# Patient Record
Sex: MALE | Race: WHITE | Employment: FULL TIME | URBAN - NONMETROPOLITAN AREA
[De-identification: names, ages, dates, MRNs, and addresses within clinical notes are randomized per-mention and may not be internally consistent; named-entity substitution may affect disease eponyms.]

---

## 2017-01-06 ENCOUNTER — OFFICE VISIT (OUTPATIENT)
Dept: FAMILY MEDICINE | Facility: OTHER | Age: 52
End: 2017-01-06
Attending: FAMILY MEDICINE
Payer: COMMERCIAL

## 2017-01-06 VITALS
OXYGEN SATURATION: 94 % | RESPIRATION RATE: 16 BRPM | BODY MASS INDEX: 30.8 KG/M2 | SYSTOLIC BLOOD PRESSURE: 130 MMHG | HEIGHT: 74 IN | WEIGHT: 240 LBS | HEART RATE: 61 BPM | DIASTOLIC BLOOD PRESSURE: 88 MMHG | TEMPERATURE: 97.2 F

## 2017-01-06 DIAGNOSIS — R05.9 COUGH: Primary | ICD-10-CM

## 2017-01-06 DIAGNOSIS — J01.01 ACUTE RECURRENT MAXILLARY SINUSITIS: ICD-10-CM

## 2017-01-06 DIAGNOSIS — L29.0 ANAL PRURITUS: ICD-10-CM

## 2017-01-06 PROCEDURE — 99214 OFFICE O/P EST MOD 30 MIN: CPT | Performed by: FAMILY MEDICINE

## 2017-01-06 PROCEDURE — 71020 ZZHC CHEST TWO VIEWS, FRONT/LAT: CPT | Mod: TC | Performed by: RADIOLOGY

## 2017-01-06 RX ORDER — LEVOFLOXACIN 500 MG/1
500 TABLET, FILM COATED ORAL DAILY
Qty: 10 TABLET | Refills: 0 | Status: SHIPPED | OUTPATIENT
Start: 2017-01-06 | End: 2017-05-05

## 2017-01-06 RX ORDER — CRANBERRY FRUIT EXTRACT 200 MG
1 CAPSULE ORAL 2 TIMES DAILY
Qty: 100 CAPSULE | Refills: 3 | Status: SHIPPED | OUTPATIENT
Start: 2017-01-06

## 2017-01-06 RX ORDER — ALBUTEROL SULFATE 90 UG/1
2 AEROSOL, METERED RESPIRATORY (INHALATION) EVERY 6 HOURS PRN
Qty: 1 INHALER | Refills: 0 | Status: SHIPPED | OUTPATIENT
Start: 2017-01-06 | End: 2017-05-05

## 2017-01-06 ASSESSMENT — PAIN SCALES - GENERAL: PAINLEVEL: NO PAIN (0)

## 2017-01-06 NOTE — NURSING NOTE
"Chief Complaint   Patient presents with     URI       Initial /88 mmHg  Pulse 61  Temp(Src) 97.2  F (36.2  C)  Resp 16  Ht 6' 2\" (1.88 m)  Wt 240 lb (108.863 kg)  BMI 30.80 kg/m2  SpO2 94% Estimated body mass index is 30.8 kg/(m^2) as calculated from the following:    Height as of this encounter: 6' 2\" (1.88 m).    Weight as of this encounter: 240 lb (108.863 kg).  BP completed using cuff size: baljit Patterson      "

## 2017-01-06 NOTE — MR AVS SNAPSHOT
After Visit Summary   1/6/2017    Stu Lanza    MRN: [id number]           Patient Information     Date Of Birth          1965        Visit Information        Provider Department      1/6/2017 9:20 AM Kwame Vasquez MD [place] [facility]        Today's Diagnoses     Cough    -  1     Acute recurrent maxillary sinusitis         Anal pruritus           Care Instructions    Use albuterol metered dose inhaler as needed for cough.  Take levofloxacin once daily.        Follow-ups after your visit        Follow-up notes from your care team     Return if symptoms worsen or fail to improve.      Your next 10 appointments already scheduled     Mervin 10, 2017  2:45 PM   (Arrive by 2:30 PM)   Office Visit with PEDRO LUIS Lantigua MD   [facility] ([facility])    [address]  [facility] [address]   [phone]           Bring a current list of meds and any records pertaining to this visit.  For Physicals, please bring immunization records and any forms needing to be filled out.    Please arrive 15 minutes early to complete paperwork and register.              Who to contact     If you have questions or need follow up information about today's clinic visit or your schedule please contact [facility] directly at [phone].  Normal or non-critical lab and imaging results will be communicated to you by MyChart, letter or phone within 4 business days after the clinic has received the results. If you do not hear from us within 7 days, please contact the clinic through MyChart or phone. If you have a critical or abnormal lab result, we will notify you by phone as soon as possible.  Submit refill requests through [facility] or call your pharmacy and they will forward the refill request to us. Please allow 3 business days for your refill to be completed.          Additional Information About Your Visit        MyChart Information     [facility] lets you send messages to your  "doctor, view your test results, renew your prescriptions, schedule appointments and more. To sign up, go to [email or web address] . Click on \"Log in\" on the left side of the screen, which will take you to the Welcome page. Then click on \"Sign up Now\" on the right side of the page.     You will be asked to enter the access code listed below, as well as some personal information. Please follow the directions to create your username and password.     Your access code is: [email or web address]  Expires: 2017  8:12 AM     Your access code will  in 90 days. If you need help or a new code, please call your [place] clinic or [phone].        Care EveryWhere ID     This is your Care EveryWhere ID. This could be used by other organizations to access your [place] medical records  [id number]        Your Vitals Were     Pulse Temperature Respirations Height BMI (Body Mass Index) Pulse Oximetry    61 97.2  F (36.2  C) 16 6' 2\" (1.88 m) 30.80 kg/m2 94%       Blood Pressure from Last 3 Encounters:   17 130/88   11/10/16 132/84   16 130/90    Weight from Last 3 Encounters:   17 240 lb (108.863 kg)   11/10/16 230 lb (104.327 kg)   16 244 lb 3.2 oz (110.768 kg)              We Performed the Following     XR CHEST 2 VW (Clinic Performed)          Today's Medication Changes          These changes are accurate as of: 17 11:59 PM.  If you have any questions, ask your nurse or doctor.               Start taking these medicines.        Dose/Directions    ACIDOPHILUS PROBIOTIC BLEND Caps   Used for:  Anal pruritus   Started by:  Kwame Vasquez MD        Dose:  1 capsule   Take 1 capsule by mouth 2 times daily   Quantity:  100 capsule   Refills:  3       albuterol 108 (90 BASE) MCG/ACT Inhaler   Commonly known as:  PROAIR HFA/PROVENTIL HFA/VENTOLIN HFA   Used for:  Cough   Started by:  Kwame Vasquez MD        Dose:  2 puff   Inhale 2 puffs into the lungs every 6 hours as needed for shortness of " breath / dyspnea or wheezing   Quantity:  1 Inhaler   Refills:  0         Stop taking these medicines if you haven't already. Please contact your care team if you have questions.     cetirizine-psuedoePHEDrine 5-120 MG per 12 hr tablet   Commonly known as:  zyrTEC-D   Stopped by:  Kwame Vasquez MD           NO ACTIVE MEDICATIONS   Stopped by:  Kwame Vasquez MD                Where to get your medicines      These medications were sent to [id number] - [place], [place] - [address] AT [facility] of [address], [facility] [place] [address]     Phone:  [phone]    - ACIDOPHILUS PROBIOTIC BLEND Caps  - albuterol 108 (90 BASE) MCG/ACT Inhaler  - levofloxacin 500 MG tablet             Primary Care Provider Office Phone # Fax #    [place] [phone]       [address] [address]        Thank you!     Thank you for choosing [facility]  for your care. Our goal is always to provide you with excellent care. Hearing back from our patients is one way we can continue to improve our services. Please take a few minutes to complete the written survey that you may receive in the mail after your visit with us. Thank you!             Your Updated Medication List - Protect others around you: Learn how to safely use, store and throw away your medicines at www.disposemymeds.org.          This list is accurate as of: 1/6/17 11:59 PM.  Always use your most recent med list.                   Brand Name Dispense Instructions for use    ACIDOPHILUS PROBIOTIC BLEND Caps     100 capsule    Take 1 capsule by mouth 2 times daily       albuterol 108 (90 BASE) MCG/ACT Inhaler    PROAIR HFA/PROVENTIL HFA/VENTOLIN HFA    1 Inhaler    Inhale 2 puffs into the lungs every 6 hours as needed for shortness of breath / dyspnea or wheezing       levofloxacin 500 MG tablet    LEVAQUIN    10 tablet    Take 1 tablet (500 mg) by mouth daily

## 2017-01-08 NOTE — PROGRESS NOTES
SUBJECTIVE:  Stu Lanza, 51 year old, male is seen with persistent nasal congestion, cough, and ear plugging.  These symptoms have been present over the last several weeks.  He was previously seen and placed on levofloxacin as well as Zyrtec D.  Stu noted mild improvement but then his symptoms recurred.  He now has developed cough with wheezing and chest tightness.  This is worse when exposed to cold air. He has no previous history of asthma or allergic rhinitis.    In addition, he has noted itching which involves the rectal area a well as the perinueum.  Stu has tried over the counter medications with minimal improvement.  He has questions regarding fungal infection and his recent use of antibiotics.  Stu has had previous colonoscopy.    Denies fever, shaking, chills, nausea, vomiting, or diarrhea.  No household contacts are ill.      Current Outpatient Prescriptions   Medication Sig Dispense Refill     levofloxacin (LEVAQUIN) 500 MG tablet Take 1 tablet (500 mg) by mouth daily 10 tablet 0     albuterol (PROAIR HFA/PROVENTIL HFA/VENTOLIN HFA) 108 (90 BASE) MCG/ACT Inhaler Inhale 2 puffs into the lungs every 6 hours as needed for shortness of breath / dyspnea or wheezing 1 Inhaler 0     Probiotic Product (ACIDOPHILUS PROBIOTIC BLEND) CAPS Take 1 capsule by mouth 2 times daily 100 capsule 3      No Known Allergies    History reviewed. No pertinent past medical history.  Past Surgical History   Procedure Laterality Date     Examegd  12/2015     Colonoscopy N/A 4/27/2016     Procedure: COLONOSCOPY;  Surgeon: Thomas Elizalde DO;  Location: [facility] OR     Family History   Problem Relation Age of Onset     DIABETES Father      Social History     Social History     Marital Status:      Spouse Name: N/A     Number of Children: N/A     Years of Education: N/A     Occupational History     Not on file.     Social History Main Topics     Smoking status: Never Smoker      Smokeless tobacco: Never Used      Alcohol Use: Yes     Drug Use: No     Sexual Activity: Not on file     Other Topics Concern     Parent/Sibling W/ Cabg, Mi Or Angioplasty Before 65f 55m? No     Social History Narrative         Review Of Systems  Constitutional, HEENT, cardiovascular, pulmonary, gi and gu systems are negative, except as otherwise noted.    OBJECTIVE:  Vitals: B/P: 130/88, T: 97.2, P: 61, R: 16    Exam:  Physical Exam   Constitutional: He is oriented to person, place, and time. He appears well-developed and well-nourished. No distress.   HENT:   Head: Normocephalic.   Right Ear: Hearing, tympanic membrane, external ear and ear canal normal.   Left Ear: Hearing, tympanic membrane, external ear and ear canal normal.   Nose: Right sinus exhibits maxillary sinus tenderness. Right sinus exhibits no frontal sinus tenderness. Left sinus exhibits no maxillary sinus tenderness and no frontal sinus tenderness.   Mouth/Throat: Uvula is midline and oropharynx is clear and moist. No oropharyngeal exudate or posterior oropharyngeal erythema.   Eyes: Conjunctivae are normal.   Neck: Neck supple.   Pulmonary/Chest: Effort normal. He has wheezes.   Lymphadenopathy:     He has no cervical adenopathy.   Neurological: He is alert and oriented to person, place, and time.   Skin: Skin is warm and dry.   Psychiatric: He has a normal mood and affect.     Other exam not repeated    Labs:  Admission on 04/27/2016, Discharged on 04/27/2016   Component Date Value Ref Range Status     Copath Report 04/27/2016    Final                    Value:Patient Name: JERICHO FITZGERALD  MR#: [id number]  Specimen #:   Collected: 4/27/2016  Received: 4/27/2016  Reported: 4/28/2016 16:00  Ordering Phy(s): GRISELDA HANKINS  Additional Phy(s): PEDRO LUIS CAUSEY    SPECIMEN(S):  A: Colon polyp, transverse  B: Rectal polyp  C: Skin, anus punch biopsy    FINAL DIAGNOSIS:  A: Colon, transverse, polypectomy  - Mild nonspecific inflammation    B: Colon, rectum, polypectomy  -  Tubular adenoma    C: Skin, anus, punch biopsy  - 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    CLINICAL HISTORY:  Diarrhea; colonoscopy with polypectomy.    GROSS:  A: The specimen is 0.7 x 0.5 x 0.3 cm. (1 TE in 1 block).    B: There are two pieces of tan soft tissue which are 0.2 and 0.3 cm. (2  TE in 1 block).    C: The specimen is a 0.5 x 0.5 cm disc of tan soft tissue.  After the  margin is inked it is bisected. (2 TE in 1 block). (Dictated by: Andi Rubin MD 4/27/2016 04:24 PM)    MI                          CROSCOPIC:  A: Microscopic sections show colon mucosa.  There is a mild nonspecific  increase in eosinophils in the lamina propria but not within the  epithelium.  This is probably related to the procedure.  The mucosa is  otherwise unremarkable.    B: Microscopic sections show colon mucosa.  There is a tubular adenoma.  The glands vary in size and shape with decreased mucin production.    C: Microscopic sections show skin with acanthosis and hyperkeratosis.  This appears to be a reactive change.  The etiology of this change is  not apparent on this biopsy.    CPT Codes  A: 35723-VP7  B: 87600-BN1  C: 70553-CW6    TESTING LAB LOCATION:  [address] [address]  [phone]    COLLECTION SITE:  Client: [facility]  Location: [facility] ()         ASSESSMENT/PLAN:  Cough  Chest xray performed and show no evidence of infiltrate.  Suspect post infectious bronchospasm.  Albuterol metered dose inhaler as needed.  - XR CHEST 2 VW (Clinic Performed)  - albuterol (PROAIR HFA/PROVENTIL HFA/VENTOLIN HFA) 108 (90 BASE) MCG/ACT Inhaler; Inhale 2 puffs into the lungs every 6 hours as needed for shortness of breath / dyspnea or wheezing    Acute recurrent maxillary sinusitis  Levaquin as written.  - levofloxacin (LEVAQUIN) 500 MG tablet; Take 1 tablet (500 mg) by mouth  daily    Anal pruritus  Begin probiotic.  If persists, recommend topical corticosteroid with antifungal.  - Probiotic Product (ACIDOPHILUS PROBIOTIC BLEND) CAPS; Take 1 capsule by mouth 2 times daily            Kwame Vasquez MD

## 2017-05-05 ENCOUNTER — OFFICE VISIT (OUTPATIENT)
Dept: FAMILY MEDICINE | Facility: OTHER | Age: 52
End: 2017-05-05
Attending: FAMILY MEDICINE
Payer: COMMERCIAL

## 2017-05-05 VITALS
BODY MASS INDEX: 29.53 KG/M2 | TEMPERATURE: 97.6 F | DIASTOLIC BLOOD PRESSURE: 92 MMHG | WEIGHT: 230 LBS | RESPIRATION RATE: 16 BRPM | HEART RATE: 66 BPM | SYSTOLIC BLOOD PRESSURE: 138 MMHG | OXYGEN SATURATION: 96 %

## 2017-05-05 DIAGNOSIS — J30.1 SEASONAL ALLERGIC RHINITIS DUE TO POLLEN: ICD-10-CM

## 2017-05-05 DIAGNOSIS — R19.7 DIARRHEA OF PRESUMED INFECTIOUS ORIGIN: Primary | ICD-10-CM

## 2017-05-05 DIAGNOSIS — L30.9 DERMATITIS: ICD-10-CM

## 2017-05-05 LAB
BASOPHILS # BLD AUTO: 0 10E9/L (ref 0–0.2)
BASOPHILS NFR BLD AUTO: 0.5 %
DIFFERENTIAL METHOD BLD: ABNORMAL
EOSINOPHIL # BLD AUTO: 2.1 10E9/L (ref 0–0.7)
EOSINOPHIL NFR BLD AUTO: 31.4 %
ERYTHROCYTE [DISTWIDTH] IN BLOOD BY AUTOMATED COUNT: 13.1 % (ref 10–15)
HCT VFR BLD AUTO: 44.3 % (ref 40–53)
HGB BLD-MCNC: 15.5 G/DL (ref 13.3–17.7)
IMM GRANULOCYTES # BLD: 0 10E9/L (ref 0–0.4)
IMM GRANULOCYTES NFR BLD: 0.5 %
LYMPHOCYTES # BLD AUTO: 1 10E9/L (ref 0.8–5.3)
LYMPHOCYTES NFR BLD AUTO: 15.2 %
MCH RBC QN AUTO: 30.2 PG (ref 26.5–33)
MCHC RBC AUTO-ENTMCNC: 35 G/DL (ref 31.5–36.5)
MCV RBC AUTO: 86 FL (ref 78–100)
MONOCYTES # BLD AUTO: 0.5 10E9/L (ref 0–1.3)
MONOCYTES NFR BLD AUTO: 7.5 %
NEUTROPHILS # BLD AUTO: 3 10E9/L (ref 1.6–8.3)
NEUTROPHILS NFR BLD AUTO: 44.9 %
NRBC # BLD AUTO: 0 10*3/UL
NRBC BLD AUTO-RTO: 0 /100
PLATELET # BLD AUTO: 188 10E9/L (ref 150–450)
RBC # BLD AUTO: 5.13 10E12/L (ref 4.4–5.9)
WBC # BLD AUTO: 6.7 10E9/L (ref 4–11)

## 2017-05-05 PROCEDURE — 99214 OFFICE O/P EST MOD 30 MIN: CPT | Performed by: FAMILY MEDICINE

## 2017-05-05 PROCEDURE — 36415 COLL VENOUS BLD VENIPUNCTURE: CPT | Performed by: FAMILY MEDICINE

## 2017-05-05 PROCEDURE — 85025 COMPLETE CBC W/AUTO DIFF WBC: CPT | Performed by: FAMILY MEDICINE

## 2017-05-05 RX ORDER — NYSTATIN AND TRIAMCINOLONE ACETONIDE 100000; 1 [USP'U]/G; MG/G
CREAM TOPICAL 2 TIMES DAILY
Qty: 30 G | Refills: 1 | Status: SHIPPED | OUTPATIENT
Start: 2017-05-05 | End: 2018-10-09

## 2017-05-05 RX ORDER — SACCHAROMYCES BOULARDII 250 MG
250 CAPSULE ORAL 2 TIMES DAILY
COMMUNITY
End: 2018-10-09

## 2017-05-05 ASSESSMENT — PAIN SCALES - GENERAL: PAINLEVEL: NO PAIN (1)

## 2017-05-05 NOTE — NURSING NOTE
"Chief Complaint   Patient presents with     Abdominal Pain     x 1 week     Diarrhea     x 1 week       Initial BP (!) 138/92 (BP Location: Right arm, Patient Position: Chair, Cuff Size: Adult Regular)  Pulse 66  Temp 97.6  F (36.4  C) (Tympanic)  Resp 16  Wt 230 lb (104.3 kg)  SpO2 96%  BMI 29.53 kg/m2 Estimated body mass index is 29.53 kg/(m^2) as calculated from the following:    Height as of 1/6/17: 6' 2\" (1.88 m).    Weight as of this encounter: 230 lb (104.3 kg).  Medication Reconciliation: complete   Maria E Munroe    "

## 2017-05-05 NOTE — MR AVS SNAPSHOT
"              After Visit Summary   5/5/2017    Stu Lanza    MRN: [id number]           Patient Information     Date Of Birth          1965        Visit Information        Provider Department      5/5/2017 8:40 AM Kwame Vasquez MD [facility]        Today's Diagnoses     Diarrhea of presumed infectious origin    -  1    Seasonal allergic rhinitis due to pollen        Dermatitis          Care Instructions    Cultures pending.        Follow-ups after your visit        Follow-up notes from your care team     Return if symptoms worsen or fail to improve.      Who to contact     If you have questions or need follow up information about today's clinic visit or your schedule please contact [facility] directly at [phone].  Normal or non-critical lab and imaging results will be communicated to you by MyChart, letter or phone within 4 business days after the clinic has received the results. If you do not hear from us within 7 days, please contact the clinic through MyChart or phone. If you have a critical or abnormal lab result, we will notify you by phone as soon as possible.  Submit refill requests through [facility] or call your pharmacy and they will forward the refill request to us. Please allow 3 business days for your refill to be completed.          Additional Information About Your Visit        MyChart Information     [facility] lets you send messages to your doctor, view your test results, renew your prescriptions, schedule appointments and more. To sign up, go to [email or web address] . Click on \"Log in\" on the left side of the screen, which will take you to the Welcome page. Then click on \"Sign up Now\" on the right side of the page.     You will be asked to enter the access code listed below, as well as some personal information. Please follow the directions to create your username and password.     Your access code is: [email or web address]  Expires: 8/7/2017  5:02 AM     Your " access code will  in 90 days. If you need help or a new code, please call your [facility] or [phone].        Care EveryWhere ID     This is your Care EveryWhere ID. This could be used by other organizations to access your [place] medical records  [id number]        Your Vitals Were     Pulse Temperature Respirations Pulse Oximetry BMI (Body Mass Index)       66 97.6  F (36.4  C) (Tympanic) 16 96% 29.53 kg/m2        Blood Pressure from Last 3 Encounters:   17 (!) 138/92   17 130/88   11/10/16 132/84    Weight from Last 3 Encounters:   17 230 lb (104.3 kg)   17 240 lb (108.9 kg)   11/10/16 230 lb (104.3 kg)              We Performed the Following     CBC with platelets differential          Today's Medication Changes          These changes are accurate as of: 17 11:59 PM.  If you have any questions, ask your nurse or doctor.               Start taking these medicines.        Dose/Directions    nystatin-triamcinolone cream   Commonly known as:  MYCOLOG II   Used for:  Dermatitis        Apply topically 2 times daily   Quantity:  30 g   Refills:  1            Where to get your medicines      These medications were sent to [facility] [address] [place] [address] [address] AT [facility] of [address], [facility] [address]     Phone:  [phone]     nystatin-triamcinolone cream                Primary Care Provider Office Phone # Fax #    [place] [phone]       [facility] [place] [address] [place] [address]        Thank you!     Thank you for choosing [facility]  for your care. Our goal is always to provide you with excellent care. Hearing back from our patients is one way we can continue to improve our services. Please take a few minutes to complete the written survey that you may receive in the mail after your visit with us. Thank you!             Your Updated Medication List - Protect others  around you: Learn how to safely use, store and throw away your medicines at www.disposemymeds.org.          This list is accurate as of: 5/5/17 11:59 PM.  Always use your most recent med list.                   Brand Name Dispense Instructions for use    ACIDOPHILUS PROBIOTIC BLEND Caps     100 capsule    Take 1 capsule by mouth 2 times daily       nystatin-triamcinolone cream    MYCOLOG II    30 g    Apply topically 2 times daily       saccharomyces boulardii 250 MG capsule    FLORASTOR     Take 250 mg by mouth 2 times daily

## 2017-05-05 NOTE — PROGRESS NOTES
SUBJECTIVE:  Stu Lanza, 51 year old, male is seen with diarrhea.  Present over the last week.   He weill note associated cramping.  [place]u [place] a [place] of time [place]. No household contacts are ill.  He has had previous EGD and colonoscopy.    In addition, he notes persistent nasal congestion and post nasal drainage.  Present over the last month.  Stu also notes similar symptoms in the fall.    He has had a rash.  Located in the buttock region.  Present over the last several months.  Stu has tried Nystatin with minimal improvement.    Denies fever, shaking, chills, dysphagia, change in appetite, weight loss, nausea, vomiting, melena, or hematochezia.  No change in bowel habits other that the diarrhea..      Current Outpatient Prescriptions   Medication Sig Dispense Refill     saccharomyces boulardii (FLORASTOR) 250 MG capsule Take 250 mg by mouth 2 times daily       Probiotic Product (ACIDOPHILUS PROBIOTIC BLEND) CAPS Take 1 capsule by mouth 2 times daily 100 capsule 3      No Known Allergies    No past medical history on file.  Past Surgical History:   Procedure Laterality Date     COLONOSCOPY N/A 4/27/2016    Procedure: COLONOSCOPY;  Surgeon: Thomas Elizalde DO;  Location: [facility] OR     ESOPHAGOGASTRODUODENOSCOPY  12/2015     Family History   Problem Relation Age of Onset     DIABETES Father      Social History     Social History     Marital status:      Spouse name: N/A     Number of children: N/A     Years of education: N/A     Occupational History     Not on file.     Social History Main Topics     Smoking status: Never Smoker     Smokeless tobacco: Never Used     Alcohol use Yes     Drug use: No     Sexual activity: Not on file     Other Topics Concern     Parent/Sibling W/ Cabg, Mi Or Angioplasty Before 65f 55m? No     Social History Narrative         Review Of Systems  Constitutional, HEENT, cardiovascular, pulmonary, gi and gu systems are negative, except as otherwise  noted.    OBJECTIVE:  Vitals: B/P: 138/92, T: 97.6, P: 66, R: 16    Exam:  Physical Exam   Constitutional: He is oriented to person, place, and time. He appears well-developed and well-nourished. No distress.   HENT:   Head: Normocephalic and atraumatic.   Right Ear: Hearing, tympanic membrane, external ear and ear canal normal.   Left Ear: Hearing, tympanic membrane, external ear and ear canal normal.   Nose: Nose normal. Right sinus exhibits no maxillary sinus tenderness and no frontal sinus tenderness. Left sinus exhibits no maxillary sinus tenderness and no frontal sinus tenderness.   Mouth/Throat: Oropharynx is clear and moist.   Eyes: Conjunctivae are normal.   Neck: Neck supple. No thyromegaly present.   Cardiovascular: Regular rhythm.    Pulmonary/Chest: Effort normal and breath sounds normal. He has no wheezes. He has no rales.   Abdominal: Soft. Bowel sounds are normal. He exhibits no distension and no mass. There is no hepatosplenomegaly. There is no tenderness. There is no CVA tenderness, no tenderness at McBurney's point and negative Mccartney's sign. No hernia.   Lymphadenopathy:     He has no cervical adenopathy.   Neurological: He is alert and oriented to person, place, and time.   Skin: Skin is warm and dry.   Psychiatric: He has a normal mood and affect.     Other exam not repeated    Labs:  Admission on 04/27/2016, Discharged on 04/27/2016   Component Date Value Ref Range Status     Copath Report 04/27/2016    Final                    Value:Patient Name: JERICHO FITZGERALD  MR#: [id number]  Specimen #:   Collected: 4/27/2016  Received: 4/27/2016  Reported: 4/28/2016 16:00  Ordering Phy(s): GRISELDA HANKINS  Additional Phy(s): PEDRO LUIS CAUSEY    SPECIMEN(S):  A: Colon polyp, transverse  B: Rectal polyp  C: Skin, anus punch biopsy    FINAL DIAGNOSIS:  A: Colon, transverse, polypectomy  - Mild nonspecific inflammation    B: Colon, rectum, polypectomy  - Tubular adenoma    C: Skin, anus, punch  biopsy  - 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    CLINICAL HISTORY:  Diarrhea; colonoscopy with polypectomy.    GROSS:  A: The specimen is 0.7 x 0.5 x 0.3 cm. (1 TE in 1 block).    B: There are two pieces of tan soft tissue which are 0.2 and 0.3 cm. (2  TE in 1 block).    C: The specimen is a 0.5 x 0.5 cm disc of tan soft tissue.  After the  margin is inked it is bisected. (2 TE in 1 block). (Dictated by: Andi Rubin MD 4/27/2016 04:24 PM)    MI                          CROSCOPIC:  A: Microscopic sections show colon mucosa.  There is a mild nonspecific  increase in eosinophils in the lamina propria but not within the  epithelium.  This is probably related to the procedure.  The mucosa is  otherwise unremarkable.    B: Microscopic sections show colon mucosa.  There is a tubular adenoma.  The glands vary in size and shape with decreased mucin production.    C: Microscopic sections show skin with acanthosis and hyperkeratosis.  This appears to be a reactive change.  The etiology of this change is  not apparent on this biopsy.    CPT Codes  A: 85975-VS7  B: 42219-EY8  C: 18397-BQ2    TESTING LAB LOCATION:  [address] [address]  [phone]    COLLECTION SITE:  Client: [facility]  Location: [facility])         ASSESSMENT/PLAN:  Diarrhea of presumed infectious origin  Etiology undetermined.  Reviewed potential water borne causes.  Labs reviewed.  Cultures pending.  - CBC with platelets differential  - Stool culture SSCE; Future  - Clostridium difficile Toxin B PCR; Future  - Ova and Parasite Screen; Future    Seasonal allergic rhinitis due to pollen  Discussed over the counter non sedating antihistamines.    Dermatitis  Suspect fungal in origin.  Mycolog cream as written.  - nystatin-triamcinolone (MYCOLOG II) cream; Apply topically 2 times daily            Kwame DIAZ  MD Pedro

## 2017-05-08 DIAGNOSIS — R19.7 DIARRHEA OF PRESUMED INFECTIOUS ORIGIN: ICD-10-CM

## 2017-05-08 LAB
C DIFF TOX B STL QL: NORMAL
G LAMBLIA+CRYPTOSP AG STL QL IA: NORMAL
MICRO REPORT STATUS: NORMAL
SPECIMEN SOURCE: NORMAL
SPECIMEN SOURCE: NORMAL

## 2017-05-08 PROCEDURE — 87328 CRYPTOSPORIDIUM AG IA: CPT | Performed by: FAMILY MEDICINE

## 2017-05-08 PROCEDURE — 87015 SPECIMEN INFECT AGNT CONCNTJ: CPT | Performed by: FAMILY MEDICINE

## 2017-05-08 PROCEDURE — 87899 AGENT NOS ASSAY W/OPTIC: CPT | Performed by: FAMILY MEDICINE

## 2017-05-08 PROCEDURE — 87329 GIARDIA AG IA: CPT | Performed by: FAMILY MEDICINE

## 2017-05-08 PROCEDURE — 87045 FECES CULTURE AEROBIC BACT: CPT | Performed by: FAMILY MEDICINE

## 2017-05-08 PROCEDURE — 87493 C DIFF AMPLIFIED PROBE: CPT | Performed by: FAMILY MEDICINE

## 2017-05-08 PROCEDURE — 87046 STOOL CULTR AEROBIC BACT EA: CPT | Mod: 59 | Performed by: FAMILY MEDICINE

## 2017-05-08 PROCEDURE — 87046 STOOL CULTR AEROBIC BACT EA: CPT | Performed by: FAMILY MEDICINE

## 2017-05-08 ASSESSMENT — ANXIETY QUESTIONNAIRES
6. BECOMING EASILY ANNOYED OR IRRITABLE: SEVERAL DAYS
IF YOU CHECKED OFF ANY PROBLEMS ON THIS QUESTIONNAIRE, HOW DIFFICULT HAVE THESE PROBLEMS MADE IT FOR YOU TO DO YOUR WORK, TAKE CARE OF THINGS AT HOME, OR GET ALONG WITH OTHER PEOPLE: NOT DIFFICULT AT ALL
2. NOT BEING ABLE TO STOP OR CONTROL WORRYING: NOT AT ALL
3. WORRYING TOO MUCH ABOUT DIFFERENT THINGS: SEVERAL DAYS
5. BEING SO RESTLESS THAT IT IS HARD TO SIT STILL: NOT AT ALL
7. FEELING AFRAID AS IF SOMETHING AWFUL MIGHT HAPPEN: NOT AT ALL
GAD7 TOTAL SCORE: 3
1. FEELING NERVOUS, ANXIOUS, OR ON EDGE: SEVERAL DAYS

## 2017-05-08 ASSESSMENT — PATIENT HEALTH QUESTIONNAIRE - PHQ9: 5. POOR APPETITE OR OVEREATING: NOT AT ALL

## 2017-05-09 PROBLEM — J30.1 SEASONAL ALLERGIC RHINITIS DUE TO POLLEN: Status: ACTIVE | Noted: 2017-05-09

## 2017-05-09 ASSESSMENT — ANXIETY QUESTIONNAIRES: GAD7 TOTAL SCORE: 3

## 2017-05-09 ASSESSMENT — PATIENT HEALTH QUESTIONNAIRE - PHQ9: SUM OF ALL RESPONSES TO PHQ QUESTIONS 1-9: 5

## 2017-05-11 LAB
BACTERIA SPEC CULT: NORMAL
E COLI SXT1+2 STL IA: NORMAL
MICRO REPORT STATUS: NORMAL
SPECIMEN SOURCE: NORMAL

## 2017-08-10 ENCOUNTER — OFFICE VISIT (OUTPATIENT)
Dept: FAMILY MEDICINE | Facility: OTHER | Age: 52
End: 2017-08-10
Attending: FAMILY MEDICINE
Payer: COMMERCIAL

## 2017-08-10 VITALS
SYSTOLIC BLOOD PRESSURE: 136 MMHG | RESPIRATION RATE: 18 BRPM | BODY MASS INDEX: 29.52 KG/M2 | TEMPERATURE: 97.4 F | WEIGHT: 230 LBS | OXYGEN SATURATION: 96 % | HEART RATE: 60 BPM | HEIGHT: 74 IN | DIASTOLIC BLOOD PRESSURE: 74 MMHG

## 2017-08-10 DIAGNOSIS — A69.20 ERYTHEMA MIGRANS (LYME DISEASE): Primary | ICD-10-CM

## 2017-08-10 PROCEDURE — 99213 OFFICE O/P EST LOW 20 MIN: CPT | Performed by: FAMILY MEDICINE

## 2017-08-10 RX ORDER — DOXYCYCLINE HYCLATE 100 MG
100 TABLET ORAL 2 TIMES DAILY
Qty: 28 TABLET | Refills: 0 | Status: SHIPPED | OUTPATIENT
Start: 2017-08-10 | End: 2017-08-24

## 2017-08-10 ASSESSMENT — ANXIETY QUESTIONNAIRES
5. BEING SO RESTLESS THAT IT IS HARD TO SIT STILL: NOT AT ALL
3. WORRYING TOO MUCH ABOUT DIFFERENT THINGS: NOT AT ALL
GAD7 TOTAL SCORE: 0
1. FEELING NERVOUS, ANXIOUS, OR ON EDGE: NOT AT ALL
6. BECOMING EASILY ANNOYED OR IRRITABLE: NOT AT ALL
IF YOU CHECKED OFF ANY PROBLEMS ON THIS QUESTIONNAIRE, HOW DIFFICULT HAVE THESE PROBLEMS MADE IT FOR YOU TO DO YOUR WORK, TAKE CARE OF THINGS AT HOME, OR GET ALONG WITH OTHER PEOPLE: NOT DIFFICULT AT ALL
7. FEELING AFRAID AS IF SOMETHING AWFUL MIGHT HAPPEN: NOT AT ALL
2. NOT BEING ABLE TO STOP OR CONTROL WORRYING: NOT AT ALL

## 2017-08-10 ASSESSMENT — PATIENT HEALTH QUESTIONNAIRE - PHQ9
SUM OF ALL RESPONSES TO PHQ QUESTIONS 1-9: 0
5. POOR APPETITE OR OVEREATING: NOT AT ALL

## 2017-08-10 ASSESSMENT — PAIN SCALES - GENERAL: PAINLEVEL: NO PAIN (0)

## 2017-08-10 NOTE — PROGRESS NOTES
SUBJECTIVE:  Stu is a 52 year old male who comes in today for rash.  Noted right posterior calf to be red last night with a central bite, but no tick attached.  Redness is spreading and is ring like.  Feels fine otherwise.  No fever, neck pain, headache, joint pains, malaise.  [place].    Current Outpatient Prescriptions   Medication     doxycycline (VIBRA-TABS) 100 MG tablet     saccharomyces boulardii (FLORASTOR) 250 MG capsule     nystatin-triamcinolone (MYCOLOG II) cream     Probiotic Product (ACIDOPHILUS PROBIOTIC BLEND) CAPS     No current facility-administered medications for this visit.       No Known Allergies    History reviewed. No pertinent past medical history.  Past Surgical History:   Procedure Laterality Date     COLONOSCOPY N/A 4/27/2016    Procedure: COLONOSCOPY;  Surgeon: Thomas Elizalde DO;  Location: [facility]     ESOPHAGOGASTRODUODENOSCOPY  12/2015     Social History     Social History     Marital status:      Spouse name: N/A     Number of children: N/A     Years of education: N/A     Occupational History     Not on file.     Social History Main Topics     Smoking status: Never Smoker     Smokeless tobacco: Never Used     Alcohol use Yes     Drug use: No     Sexual activity: Not on file     Other Topics Concern     Parent/Sibling W/ Cabg, Mi Or Angioplasty Before 65f 55m? No     Social History Narrative       ROS:  General: negative for, fever, chills, headaches, dizziness, fatigue  Skin: positive for as above, rash  Resp: No shortness of breath and No cough  CV: negative for, palpitations, chest pain and lower extremity edema  GI: negative for, poor appetite, nausea, vomiting and abdominal pain  : negative  Musculoskeletal: negative for, joint pain, joint swelling, joint stiffness and muscular weakness  Neurologic: negative for, headaches, local weakness, numbness or tingling of hands and numbness or tingling of feet      OBJECTIVE:  Vitals:     "08/10/17 0949   BP: 136/74   Pulse: 60   Resp: 18   Temp: 97.4  F (36.3  C)   TempSrc: Tympanic   SpO2: 96%   Weight: 230 lb (104.3 kg)   Height: 6' 2\" (1.88 m)     GENERAL APPEARANCE: healthy, alert and no distress  SKIN: right posterior calf with central bite but no embedded tick; surrounding area with 2 rings of erythema, bullseye, several cm in diameter  PSYCH: mentation appears normal. and affect normal/bright    ASSESSMENT/ORDERS:    ICD-10-CM    1. Erythema migrans (Lyme disease) A69.20 doxycycline (VIBRA-TABS) 100 MG tablet     PLAN:  Discussed diagnosis of lyme's, possible symptoms, treatment course, etc.    Complete antibiotic course.  Monitor for spreading rash, fever, body aches, joint pain, etc.    See patient instructions.    Rebecca Duong    "

## 2017-08-10 NOTE — NURSING NOTE
"Chief Complaint   Patient presents with     Tick Bite     lower left calf has red [place] around the area. Didn't pull off tick       Initial /74  Pulse 60  Temp 97.4  F (36.3  C) (Tympanic)  Resp 18  Ht 6' 2\" (1.88 m)  Wt 230 lb (104.3 kg)  SpO2 96%  BMI 29.53 kg/m2 Estimated body mass index is 29.53 kg/(m^2) as calculated from the following:    Height as of this encounter: 6' 2\" (1.88 m).    Weight as of this encounter: 230 lb (104.3 kg).  Medication Reconciliation: complete   Whit Robertson      "

## 2017-08-10 NOTE — MR AVS SNAPSHOT
After Visit Summary   8/10/2017    Stu Lanza    MRN: [id number]           Patient Information     Date Of Birth          1965        Visit Information        Provider Department      8/10/2017 10:00 AM Rebecca Mojica MD [facility]        Today's Diagnoses     Erythema migrans (Lyme disease)    -  1      Care Instructions    Complete antibiotic course.  Monitor for spreading rash, fever, body aches, joint pain, etc.      Lyme Disease  Lyme disease is caused by bacteria. The infection is most often passed during the bite of a deer tick. The tick is very small, so many people with Lyme disease do not know they have been bitten. Tests for Lyme disease are not always accurate early in the disease. If the disease is suspected, treatment may begin before testing confirms the infection. A long course of antibiotics is the standard treatment.  If untreated, Lyme disease can worsen and full-body symptoms can develop         Early local symptoms may appear within a few days to a month after the tick bite. These symptoms may include a round, red rash that looks like a bull's-eye target with darker outer ring and a darker center. There may fever, chills, fatigue, body aches, and headache. In time, the rash goes away, even without treatment. That doesn't mean the infection has gone away, however. In some cases, early local symptoms never develop.    Early disseminated symptoms may appear weeks to months after the bite. These can include muscle aches, fatigue, fever, headache, stiff neck, and joint pain and swelling.    Late-stage symptoms include weakness in an arm, leg or one side of the face, headache, fever, and numbness and tingling in the arms or legs, confusion, and memory loss.  Testing is done for the presence of the bacteria. When the infection is treated early, it can be cured. In some cases, a second or third course of antibiotics may be needed. Be sure to follow your  healthcare providers directions about treatment.  Home care  If oral antibiotics have been prescribed, take them exactly as directed until they are completely gone. Do not stop taking them until you have taken the full course or your healthcare provider has told you to stop.  Ask your healthcare provider about taking over-the-counter medicines to control symptoms such as aches and fever.  Follow-up care  Follow up with your healthcare provider as advised. Be sure to return for follow-up testing as directed to be sure the infection has been treated.  When to seek medical advice  Call your healthcare provider right away if any of the following occur:    Current symptoms get worse    Unexplained fever, neck pain or stiffness, or headache    Arm, leg or facial weakness    Joint pain or swelling    Numbness and tingling in the arms or legs    Confusion or memory loss    Irregular or rapid heartbeat  Date Last Reviewed: 9/25/2015 2000-2017 [employer]. [address] [place] [address]. All rights reserved. This information is not intended as a substitute for professional medical care. Always follow your healthcare professional's instructions.        Preventing Lyme Disease  Ticks are small spider-like arachnids that feed on the blood of animals and humans. They can carry the bacteria that cause Lyme disease. The bacteria can pass to a person from a tick bite. (It is not passed from person to person.) Lyme disease can lead to serious health problems if it is not treated with antibiotics. The best way to prevent Lyme disease is to avoid being bitten by a tick.     The tick that carries Lyme disease is very small--about the size of a poppy seed.   Preventing tick bites  The disease is carried by the blacklegged tick, also called a  deer tick.  Young ticks called nymphs are the most likely to carry the bacteria. They are very small--about the size of a poppy seed. They can be found on deer, rodents,  and birds. Often the animal brushes the tick onto leaves or other plants as it runs through the woods and then the tick lives in bushes, grasses, and dead leaves. The most active time of year for infected ticks varies by region of the country. In the East and [place], they are more active from April to September. In the West, they may be more active from December to February. But you can still be bitten at other times of the year. Below are tips for protecting yourself from tick bites.  Protect your body    Wear clothes that protect you. Clothing can help protect you from tick bites. Wear long pants and long sleeves in outdoor areas where ticks may live. Tuck your shirt into your pants. Tuck pant legs into your socks. And wear light colors so you can more easily see ticks on your clothes.    Use insect repellent. Spray insect repellent containing at least 20 percent DEET on your exposed skin. You can also use it on clothing, shoes, and camping gear. Avoid getting DEET on children s hands, mouth, or eyes. You can use products with permethrin on clothing, shoes, and camping gear. But do not spray permethrin on skin. It will cause a rash. Follow the directions on the package of the spray you use. For more information on bug sprays, visit the National Pesticide Information Center at http://npic.ors.edu.    Avoid tick-infested areas. Avoid brushing against grasses, bushes, and other plants. Avoid walking through dead leaves and other ground vegetation. Do not sit on fallen logs. And avoid areas with large numbers of deer and rodents.    Check yourself for ticks. After being outdoors, check your clothes and skin for ticks. Keep in mind that the ticks are about the size of a poppy seed. Use both a hand-held and a full-length mirror to view all of your skin. Pay special attention to areas with hair. Make sure to thoroughly check these areas:    Scalp    Behind the ears    Armpits    Belly button    Waist    Groin    Backs  of the knees    Use the clothes dryer. Putting clothing or bedding into a clothes dryer for 1 hour at high heat has been shown to kill ticks.  Control ticks around your home    Create tick-free safety zones. Ticks that transmit Lyme disease live in ground vegetation. Ticks crawl onto people from shrubs and grasses in and around wooded areas. Cut bushes and other plants away from the deck or patio and any child play areas. Keep all grasses cut short. Remove dead leaves and other dead vegetation. Do not put children s play equipment near wooded areas. Put wood chips or gravel on the ground between lawns and wooded areas.    Use pesticides. You can apply them yourself or hire a pest control expert. States have different regulations about pesticides. Ask your local health department for information.    Keep deer away. Deer often carry the ticks that can infect you with Lyme disease. Do not attempt to pet or feed deer. Ask your local garden center about deer-resistant plants. Ask your local health department about deer control in your area.    Prevent ticks on pets. Pets can bring ticks into your home. Use tick control medicine as advised by your . Check your pet for ticks after it comes indoors. Pay special attention to the ears.    Ask about local tick-control methods. Some states have tick-control programs. Local health departments may be using methods that can help you control ticks at home.  If you have a tick  If you find a tick on your skin, do not panic. Most ticks don't carry Lyme disease. And the tick must be attached for 36 to 48 hours before it might infect you. If you find a tick on you, here s what to do:    If the tick is not yet attached to your skin, remove the tick with tweezers or a tissue. Flush it down the toilet. If you see a tick on your clothes, use a piece of tape to lift it off. Do not touch it with your bare hands.    If the tick is attached to your skin:    Carefully remove the  tick with tweezers. If you don t have tweezers, use your fingers protected by a paper towel or a thin cloth. Grasp the tick as close to your skin as possible. Pull slowly and gently to remove the tick. The tick may not let go right away. Do not pull harder. Be patient and keep trying gently. Do not twist the head or body as you pull. Do not crush or squeeze the body. This can release the bacteria into your body. Never use a hot match, petroleum jelly, or other products to remove a tick. (If you can t remove the tick or if part of the tick remains in the skin, call your healthcare provider right away.)    Wash your skin with soap and water after you remove the tick. This will help ensure you remove any bacteria.    If you can, save the tick in a tightly sealed glass or plastic container. Take it to your healthcare provider. He or she may be able to have someone identify if it is the type of tick that transmits Lyme.    Call your healthcare provider and describe the bite and the tick. You may be asked to come in for an exam. You may be tested for Lyme. You may also be prescribed antibiotics to help prevent infection.    Over the next month, watch for the symptoms below, especially a rash at the site of the bite.  Symptoms of Lyme disease  Call your healthcare provider if you develop any symptoms of Lyme disease, even if you don t remember being bitten. These include:    A round, red rash (called a bull s-eye rash)    Fever and chills    Tiredness or body aches    Headache or a stiff neck    Joint pain and swelling (arthritis), especially in large joints such as the knees   To learn more    Centers for Disease Control and Prevention: www.cdc.gov/lyme    American Lyme Disease Foundation: www.aldf.com  Date Last Reviewed: 11/1/2016 2000-2017 [employer]. [address] [place] [address], [place] [address]. All rights reserved. This information is not intended as a substitute for professional medical care. Always  "follow your healthcare professional's instructions.                Follow-ups after your visit        Who to contact     If you have questions or need follow up information about today's clinic visit or your schedule please contact [facility] directly at [phone].  Normal or non-critical lab and imaging results will be communicated to you by MyChart, letter or phone within 4 business days after the clinic has received the results. If you do not hear from us within 7 days, please contact the clinic through MyChart or phone. If you have a critical or abnormal lab result, we will notify you by phone as soon as possible.  Submit refill requests through [employer] or call your pharmacy and they will forward the refill request to us. Please allow 3 business days for your refill to be completed.          Additional Information About Your Visit        Hello [employer] Information     [employer] lets you send messages to your doctor, view your test results, renew your prescriptions, schedule appointments and more. To sign up, go to [email or web address] . Click on \"Log in\" on the left side of the screen, which will take you to the Welcome page. Then click on \"Sign up Now\" on the right side of the page.     You will be asked to enter the access code listed below, as well as some personal information. Please follow the directions to create your username and password.     Your access code is: [email or web address]  Expires: 2017 10:12 AM     Your access code will  in 90 days. If you need help or a new code, please call your [facility] or [phone].        Care EveryWhere ID     This is your Care EveryWhere ID. This could be used by other organizations to access your [facility] medical records  [id number]        Your Vitals Were     Pulse Temperature Respirations Height Pulse Oximetry BMI (Body Mass Index)    60 97.4  F (36.3  C) (Tympanic) 18 6' 2\" (1.88 m) 96% 29.53 kg/m2       Blood Pressure from Last 3 " Encounters:   08/10/17 136/74   05/05/17 (!) 138/92   01/06/17 130/88    Weight from Last 3 Encounters:   08/10/17 230 lb (104.3 kg)   05/05/17 230 lb (104.3 kg)   01/06/17 240 lb (108.9 kg)              Today, you had the following     No orders found for display         Today's Medication Changes          These changes are accurate as of: 8/10/17 10:12 AM.  If you have any questions, ask your nurse or doctor.               Start taking these medicines.        Dose/Directions    doxycycline 100 MG tablet   Commonly known as:  VIBRA-TABS   Used for:  Erythema migrans (Lyme disease)   Started by:  Rebecca Mojica MD        Dose:  100 mg   Take 1 tablet (100 mg) by mouth 2 times daily for 14 days   Quantity:  28 tablet   Refills:  0            Where to get your medicines      These medications were sent to [facility] [address] [place] [address], [place] - [address] AT [facility] [address], [facility] [address]     Phone:  [phone]     doxycycline 100 MG tablet                Primary Care Provider Office Phone # Fax #    [place] [phone]       [address] [address]        Equal Access to Services     DORY GODDARD AH: Hadii yong ku hadasho Soomaali, waaxda luqadaha, qaybta kaalmada adeegyada, maria luisa nyein hayaan helio carlton . So [facility] [phone].    ATENCIÓN: Si habla español, tiene a melendrez disposición servicios gratuitos de asistencia lingüística. Llame al [phone].    We comply with applicable federal civil rights laws and [place] laws. We do not discriminate on the basis of race, color, national origin, age, disability sex, sexual orientation or gender identity.            Thank you!     Thank you for choosing [facility]  for your care. Our goal is always to provide you with excellent care. Hearing back from our patients is one way we can continue to improve our services. Please take a few minutes to  complete the written survey that you may receive in the mail after your visit with us. Thank you!             Your Updated Medication List - Protect others around you: Learn how to safely use, store and throw away your medicines at www.disposemymeds.org.          This list is accurate as of: 8/10/17 10:12 AM.  Always use your most recent med list.                   Brand Name Dispense Instructions for use Diagnosis    ACIDOPHILUS PROBIOTIC BLEND Caps     100 capsule    Take 1 capsule by mouth 2 times daily    Anal pruritus       doxycycline 100 MG tablet    VIBRA-TABS    28 tablet    Take 1 tablet (100 mg) by mouth 2 times daily for 14 days    Erythema migrans (Lyme disease)       nystatin-triamcinolone cream    MYCOLOG II    30 g    Apply topically 2 times daily    Dermatitis       saccharomyces boulardii 250 MG capsule    FLORASTOR     Take 250 mg by mouth 2 times daily

## 2017-08-11 ASSESSMENT — ANXIETY QUESTIONNAIRES: GAD7 TOTAL SCORE: 0

## 2017-12-18 ENCOUNTER — TELEPHONE (OUTPATIENT)
Dept: FAMILY MEDICINE | Facility: OTHER | Age: 52
End: 2017-12-18

## 2017-12-18 DIAGNOSIS — R05.9 COUGH: ICD-10-CM

## 2017-12-18 RX ORDER — ALBUTEROL SULFATE 90 UG/1
2 AEROSOL, METERED RESPIRATORY (INHALATION) EVERY 6 HOURS PRN
Qty: 1 INHALER | Refills: 0 | Status: SHIPPED | OUTPATIENT
Start: 2017-12-18 | End: 2018-01-08

## 2017-12-18 NOTE — TELEPHONE ENCOUNTER
Patient is requesting a refill on his inhaler, states he has trouble with the cold air. Last fill on inhaler was 1/16/2017, order pending   JESÚS ORTEGA

## 2017-12-18 NOTE — TELEPHONE ENCOUNTER
3:38 PM    Reason for Call: Phone Call    Description: Patient called in on his inhaler rx - he stated he tossed the rx or lost it.     Was an appointment offered for this call? No  If yes : Appointment type              Date    Preferred method for responding to this message: Telephone Call  What is your phone number ? [phone]    If we cannot reach you directly, may we leave a detailed response at the number you provided? Yes    Can this message wait until your PCP/provider returns, if available today? Not applicable,     Lani Leon

## 2018-01-08 ENCOUNTER — OFFICE VISIT (OUTPATIENT)
Dept: FAMILY MEDICINE | Facility: OTHER | Age: 53
End: 2018-01-08
Attending: FAMILY MEDICINE
Payer: COMMERCIAL

## 2018-01-08 VITALS
OXYGEN SATURATION: 95 % | HEART RATE: 67 BPM | HEIGHT: 74 IN | BODY MASS INDEX: 29.52 KG/M2 | TEMPERATURE: 98 F | SYSTOLIC BLOOD PRESSURE: 134 MMHG | WEIGHT: 230 LBS | RESPIRATION RATE: 16 BRPM | DIASTOLIC BLOOD PRESSURE: 72 MMHG

## 2018-01-08 DIAGNOSIS — R05.9 COUGH: Primary | ICD-10-CM

## 2018-01-08 DIAGNOSIS — J98.01 ACUTE BRONCHOSPASM: ICD-10-CM

## 2018-01-08 DIAGNOSIS — J20.9 ACUTE BRONCHITIS, UNSPECIFIED ORGANISM: ICD-10-CM

## 2018-01-08 DIAGNOSIS — B37.2 YEAST INFECTION OF THE SKIN: ICD-10-CM

## 2018-01-08 PROCEDURE — 99214 OFFICE O/P EST MOD 30 MIN: CPT | Performed by: FAMILY MEDICINE

## 2018-01-08 RX ORDER — FLUCONAZOLE 150 MG/1
150 TABLET ORAL
Qty: 4 TABLET | Refills: 0 | Status: SHIPPED | OUTPATIENT
Start: 2018-01-08 | End: 2018-10-09

## 2018-01-08 RX ORDER — PREDNISONE 20 MG/1
20 TABLET ORAL 2 TIMES DAILY
Qty: 10 TABLET | Refills: 0 | Status: SHIPPED | OUTPATIENT
Start: 2018-01-08 | End: 2018-10-09

## 2018-01-08 RX ORDER — ALBUTEROL SULFATE 90 UG/1
2 AEROSOL, METERED RESPIRATORY (INHALATION) EVERY 6 HOURS PRN
Qty: 1 INHALER | Refills: 0 | Status: SHIPPED | OUTPATIENT
Start: 2018-01-08 | End: 2019-04-16

## 2018-01-08 RX ORDER — AZITHROMYCIN 250 MG/1
TABLET, FILM COATED ORAL
Qty: 6 TABLET | Refills: 0 | Status: SHIPPED | OUTPATIENT
Start: 2018-01-08 | End: 2018-10-09

## 2018-01-08 ASSESSMENT — PAIN SCALES - GENERAL: PAINLEVEL: NO PAIN (0)

## 2018-01-08 NOTE — NURSING NOTE
"Chief Complaint   Patient presents with     Breathing Problem     Been going on since the first weekend in October.  Feels starved for air.  States that it isn't temp related but that it happens with exertion, sleep and just talking.         Initial /72 (BP Location: Right arm, Patient Position: Sitting, Cuff Size: Adult Regular)  Pulse 67  Temp 98  F (36.7  C)  Resp 16  Ht 6' 2\" (1.88 m)  Wt 230 lb (104.3 kg)  SpO2 95%  BMI 29.53 kg/m2 Estimated body mass index is 29.53 kg/(m^2) as calculated from the following:    Height as of this encounter: 6' 2\" (1.88 m).    Weight as of this encounter: 230 lb (104.3 kg).  Medication Reconciliation: complete     Noemí Brewer      "

## 2018-01-08 NOTE — MR AVS SNAPSHOT
After Visit Summary   1/8/2018    Stu Lanza    MRN: [id number]           Patient Information     Date Of Birth          1965        Visit Information        Provider Department      1/8/2018 11:15 AM Rebecca Mojica MD [facility]        Today's Diagnoses     Cough    -  1    Acute bronchospasm        Acute bronchitis, unspecified organism        Yeast infection of the skin          Care Instructions    Complete course of antibiotic and prednisone.  Continue inhaler use as needed.  Continue Zyrtec.  Could add OTC Flonase/Nasonex for chronic post nasal drip.  Schedule PFTs to further evaluate for underlying asthma or other lung condition.  Consider allergy testing.  Consider trial of Singular to treat both allergies and asthma.  Diflucan for recurrent yeast/fungus - sent to pharmacy as well.              Follow-ups after your visit        Future tests that were ordered for you today     Open Future Orders        Priority Expected Expires Ordered    Pulmonary Function Test Routine  1/8/2019 1/8/2018            Who to contact     If you have questions or need follow up information about today's clinic visit or your schedule please contact [facility] directly at [phone].  Normal or non-critical lab and imaging results will be communicated to you by [employer], letter or phone within 4 business days after the clinic has received the results. If you do not hear from us within 7 days, please contact the clinic through [employer] or phone. If you have a critical or abnormal lab result, we will notify you by phone as soon as possible.  Submit refill requests through [employer] or call your pharmacy and they will forward the refill request to us. Please allow 3 business days for your refill to be completed.          Additional Information About Your Visit        MyChart Information     [employer] lets you send messages to your doctor, view your test results, renew your  "prescriptions, schedule appointments and more. To sign up, go to [email or web address] . Click on \"Log in\" on the left side of the screen, which will take you to the Welcome page. Then click on \"Sign up Now\" on the right side of the page.     You will be asked to enter the access code listed below, as well as some personal information. Please follow the directions to create your username and password.     Your access code is: [email or web address]  Expires: 2018 11:48 AM     Your access code will  in 90 days. If you need help or a new code, please call your [place] clinic or [phone].        Care EveryWhere ID     This is your Care EveryWhere ID. This could be used by other organizations to access your [place] medical records  [id number]        Your Vitals Were     Pulse Temperature Respirations Height Pulse Oximetry BMI (Body Mass Index)    67 98  F (36.7  C) 16 6' 2\" (1.88 m) 95% 29.53 kg/m2       Blood Pressure from Last 3 Encounters:   18 134/72   08/10/17 136/74   17 (!) 138/92    Weight from Last 3 Encounters:   18 230 lb (104.3 kg)   08/10/17 230 lb (104.3 kg)   17 230 lb (104.3 kg)                 Today's Medication Changes          These changes are accurate as of: 18 11:48 AM.  If you have any questions, ask your nurse or doctor.               Start taking these medicines.        Dose/Directions    azithromycin 250 MG tablet   Commonly known as:  ZITHROMAX   Used for:  Acute bronchitis, unspecified organism   Started by:  Rebecca Mojica MD        Two tablets first day, then one tablet daily for four days.   Quantity:  6 tablet   Refills:  0       fluconazole 150 MG tablet   Commonly known as:  DIFLUCAN   Used for:  Yeast infection of the skin   Started by:  Rebecca Mojica MD        Dose:  150 mg   Take 1 tablet (150 mg) by mouth every 3 days   Quantity:  4 tablet   Refills:  0       predniSONE 20 MG tablet   Commonly known as:  DELTASONE   Used for:  Cough, " Acute bronchospasm, Acute bronchitis, unspecified organism   Started by:  Rebecca Mojica MD        Dose:  20 mg   Take 1 tablet (20 mg) by mouth 2 times daily   Quantity:  10 tablet   Refills:  0            Where to get your medicines      These medications were sent to [id number] [place] [place] - [address] AT [facility] [address], [facility] [address]     Phone:  [phone]     albuterol 108 (90 BASE) MCG/ACT Inhaler    azithromycin 250 MG tablet    fluconazole 150 MG tablet    predniSONE 20 MG tablet                Primary Care Provider Office Phone # Fax #    [place] Lant[place]ua [phone]       [facility] [address] [place] [address]        Equal Access to Services     DORY GODDARD : Hadii yong christie hadasho Soomaali, waaxda luqadaha, qaybta kaalmada adeegyada, maria luisa carlton . [facility] [phone].    ATENCIÓN: Si habla español, tiene a melendrez disposición servicios gratuitos de asistencia lingüística. Llame al [phone].    We comply with applicable federal civil rights laws and [place] laws. We do not discriminate on the basis of race, color, national origin, age, disability, sex, sexual orientation, or gender identity.            Thank you!     Thank you for choosing [facility]  for your care. Our goal is always to provide you with excellent care. Hearing back from our patients is one way we can continue to improve our services. Please take a few minutes to complete the written survey that you may receive in the mail after your visit with us. Thank you!             Your Updated Medication List - Protect others around you: Learn how to safely use, store and throw away your medicines at www.disposemymeds.org.          This list is accurate as of: 1/8/18 11:48 AM.  Always use your most recent med list.                   Brand Name Dispense Instructions for use Diagnosis    ACIDOPHILUS PROBIOTIC  BLEND Caps     100 capsule    Take 1 capsule by mouth 2 times daily    Anal pruritus       albuterol 108 (90 BASE) MCG/ACT Inhaler    PROAIR HFA/PROVENTIL HFA/VENTOLIN HFA    1 Inhaler    Inhale 2 puffs into the lungs every 6 hours as needed for shortness of breath / dyspnea or wheezing    Cough, Acute bronchospasm, Acute bronchitis, unspecified organism       azithromycin 250 MG tablet    ZITHROMAX    6 tablet    Two tablets first day, then one tablet daily for four days.    Acute bronchitis, unspecified organism       fluconazole 150 MG tablet    DIFLUCAN    4 tablet    Take 1 tablet (150 mg) by mouth every 3 days    Yeast infection of the skin       nystatin-triamcinolone cream    MYCOLOG II    30 g    Apply topically 2 times daily    Dermatitis       predniSONE 20 MG tablet    DELTASONE    10 tablet    Take 1 tablet (20 mg) by mouth 2 times daily    Cough, Acute bronchospasm, Acute bronchitis, unspecified organism       saccharomyces boulardii 250 MG capsule    FLORASTOR     Take 250 mg by mouth 2 times daily

## 2018-01-08 NOTE — PROGRESS NOTES
SUBJECTIVE: 52 year old male complaining of cough, dyspnea, wheeze onset October.  History of similar symptoms last year, treated with Levaquin and inhaler.  Past 2 weeks, breathing more labored.  Hears wheezing.  Inhaler helps some.  Symptoms worse at night.  No fever or night sweats.  Occasional headache.  Chronic post nasal drip.  Non-smoker.  Job exposure as  an then in  at [employer].    XRy 1/6/17 negative.    History reviewed. No pertinent past medical history.  Past Surgical History:   Procedure Laterality Date     COLONOSCOPY N/A 4/27/2016    Procedure: COLONOSCOPY;  Surgeon: Thomas Elizalde DO;  Location: [facility] OR     ESOPHAGOGASTRODUODENOSCOPY  12/2015     Social History     Social History     Marital status:      Spouse name: N/A     Number of children: N/A     Years of education: N/A     Occupational History     Not on file.     Social History Main Topics     Smoking status: Never Smoker     Smokeless tobacco: Never Used     Alcohol use Yes     Drug use: No     Sexual activity: Not on file     Other Topics Concern     Parent/Sibling W/ Cabg, Mi Or Angioplasty Before 65f 55m? No     Social History Narrative       OBJECTIVE: The patient appears healthy, alert and no distress.   EARS: External ears normal. Canals clear. TM's normal.  NOSE/SINUS: positive findings: mucosa erythematous and swollen   THROAT: post nasal drainage   NECK:Neck supple. No adenopathy. Thyroid symmetric, normal size,   CHEST: diffuse expiratory wheezing and poor air movement  CV: regular rate, rhythm    ASSESSMENT/PLAN:    (R05) Cough  (primary encounter diagnosis)  Comment: discussed differential - bronchospasm due to URI, allergies, asthma, other lung disease  Plan: albuterol (PROAIR HFA/PROVENTIL HFA/VENTOLIN         HFA) 108 (90 BASE) MCG/ACT Inhaler, predniSONE         (DELTASONE) 20 MG tablet, General PFT Lab         (Please always keep checked), Pulmonary         Function Test           (J98.01) Acute  bronchospasm  Plan: albuterol (PROAIR HFA/PROVENTIL HFA/VENTOLIN         HFA) 108 (90 BASE) MCG/ACT Inhaler, predniSONE         (DELTASONE) 20 MG tablet, General PFT Lab         (Please always keep checked), Pulmonary         Function Test           (J20.9) Acute bronchitis, unspecified organism  Plan: albuterol (PROAIR HFA/PROVENTIL HFA/VENTOLIN         HFA) 108 (90 BASE) MCG/ACT Inhaler, predniSONE         (DELTASONE) 20 MG tablet, azithromycin         (ZITHROMAX) 250 MG tablet, Pulmonary Function         Test    (B37.2) Yeast infection of the skin  Comment: mentioned at close of visit; has recurrent issues with yeast in gluteal region; has used topical agents over time; given recurrence and current antibiotics, will try Diflucan as wel  Plan: fluconazole (DIFLUCAN) 150 MG tablet

## 2018-01-08 NOTE — PATIENT INSTRUCTIONS
Complete course of antibiotic and prednisone.  Continue inhaler use as needed.  Continue Zyrtec.  Could add OTC Flonase/Nasonex for chronic post nasal drip.  Schedule PFTs to further evaluate for underlying asthma or other lung condition.  Consider allergy testing.  Consider trial of Singular to treat both allergies and asthma.  Diflucan for recurrent yeast/fungus - sent to pharmacy as well.

## 2018-02-06 ENCOUNTER — HOSPITAL ENCOUNTER (OUTPATIENT)
Dept: RESPIRATORY THERAPY | Facility: HOSPITAL | Age: 53
Discharge: HOME | End: 2018-02-06
Attending: FAMILY MEDICINE | Admitting: FAMILY MEDICINE
Payer: COMMERCIAL

## 2018-02-06 DIAGNOSIS — J98.01 ACUTE BRONCHOSPASM: ICD-10-CM

## 2018-02-06 DIAGNOSIS — R05.9 COUGH: ICD-10-CM

## 2018-02-06 DIAGNOSIS — J20.9 ACUTE BRONCHITIS, UNSPECIFIED ORGANISM: ICD-10-CM

## 2018-02-06 LAB
COHGB MFR BLD: 1.5 % (ref 0–2)
HGB BLD-MCNC: 15.3 G/DL (ref 13.3–17.7)

## 2018-02-06 PROCEDURE — 94729 DIFFUSING CAPACITY: CPT

## 2018-02-06 PROCEDURE — 36415 COLL VENOUS BLD VENIPUNCTURE: CPT | Performed by: FAMILY MEDICINE

## 2018-02-06 PROCEDURE — 94726 PLETHYSMOGRAPHY LUNG VOLUMES: CPT

## 2018-02-06 PROCEDURE — 94010 BREATHING CAPACITY TEST: CPT | Mod: 26 | Performed by: INTERNAL MEDICINE

## 2018-02-06 PROCEDURE — 82375 ASSAY CARBOXYHB QUANT: CPT | Performed by: FAMILY MEDICINE

## 2018-02-06 PROCEDURE — 94726 PLETHYSMOGRAPHY LUNG VOLUMES: CPT | Mod: 26 | Performed by: INTERNAL MEDICINE

## 2018-02-06 PROCEDURE — 94729 DIFFUSING CAPACITY: CPT | Mod: 26 | Performed by: INTERNAL MEDICINE

## 2018-02-06 PROCEDURE — 85018 HEMOGLOBIN: CPT | Performed by: FAMILY MEDICINE

## 2018-02-06 PROCEDURE — 94010 BREATHING CAPACITY TEST: CPT

## 2018-02-06 RX ORDER — ALBUTEROL SULFATE 0.83 MG/ML
2.5 SOLUTION RESPIRATORY (INHALATION)
Status: DISCONTINUED | OUTPATIENT
Start: 2018-02-06 | End: 2018-02-07 | Stop reason: HOSPADM

## 2018-02-08 ENCOUNTER — TELEPHONE (OUTPATIENT)
Dept: FAMILY MEDICINE | Facility: OTHER | Age: 53
End: 2018-02-08

## 2018-07-12 ENCOUNTER — APPOINTMENT (OUTPATIENT)
Dept: CHIROPRACTIC MEDICINE | Facility: OTHER | Age: 53
End: 2018-07-12

## 2018-07-12 ENCOUNTER — APPOINTMENT (OUTPATIENT)
Dept: OCCUPATIONAL MEDICINE | Facility: OTHER | Age: 53
End: 2018-07-12

## 2018-07-12 PROCEDURE — 99499 UNLISTED E&M SERVICE: CPT

## 2018-10-02 NOTE — PROGRESS NOTES
SUBJECTIVE:   Stu Lanza is a 53 year old male who presents to clinic today for the following health issues:      RESPIRATORY SYMPTOMS      Duration: about month    Description  nasal congestion and cough and SOB quickly    Severity: mild    Accompanying signs and symptoms: None    History (predisposing factors):  none    Precipitating or alleviating factors: None    Therapies tried and outcome:  Zyrtec and inhaler PRN and sometimes flonase    Each year but this time gets sinus congestion and has issues.  Last winter did do PFTs that were normal he is a non-smoker         PAST MEDICAL HISTORY:  History reviewed. No pertinent past medical history.    PAST SURGICAL HISTORY:  Past Surgical History:   Procedure Laterality Date     COLONOSCOPY N/A 4/27/2016    Procedure: COLONOSCOPY;  Surgeon: Thomas Elizalde DO;  Location: [facility]     ESOPHAGOGASTRODUODENOSCOPY  12/2015     PULMONARY FUNCTION TEST  02/06/2018    normal       MEDICATIONS:  Prior to Admission medications    Medication Sig Start Date End Date Taking? Authorizing Provider   albuterol (PROAIR HFA/PROVENTIL HFA/VENTOLIN HFA) 108 (90 BASE) MCG/ACT Inhaler Inhale 2 puffs into the lungs every 6 hours as needed for shortness of breath / dyspnea or wheezing 1/8/18  Yes Rebecca Mojica MD   amoxicillin-clavulanate (AUGMENTIN) 875-125 MG per tablet Take 1 tablet by mouth 2 times daily 10/9/18  Yes PEDRO LUIS Lantigua MD   Cetirizine-Pseudoephedrine (ZYRTEC-D PO)    Yes Reported, Patient   Probiotic Product (ACIDOPHILUS PROBIOTIC BLEND) CAPS Take 1 capsule by mouth 2 times daily 1/6/17  Yes Kwame Vasquez MD       ALLERGIES:   No Known Allergies    ROS:  Constitutional, HEENT, cardiovascular, pulmonary, gi and gu systems are negative, except as otherwise noted.      EXAM:  BP (!) 144/102  Pulse 66  Temp 97.2  F (36.2  C) (Tympanic)  Wt 251 lb (113.9 kg)  SpO2 94%  BMI 32.23 kg/m2 Body mass index is 32.23 kg/(m^2).   GENERAL APPEARANCE: healthy,  alert and no distress  EYES: Eyes grossly normal to inspection, PERRL and conjunctivae and sclerae normal  HENT: ear canals and TM's normal and nose and mouth without ulcers or lesions, has maxillary sinus tenderness  NECK: no adenopathy, no asymmetry, masses, or scars and thyroid normal to palpation  RESP: lungs clear to auscultation - no rales, rhonchi or wheezes  CV: regular rates and rhythm, normal S1 S2, no S3 or S4 and no murmur, click or rub  NEURO: Normal strength and tone, mentation intact and speech normal  Lab/ X-ray  No results found for this or any previous visit (from the past 24 hour(s)).    ASSESSMENT/PLAN:    ICD-10-CM    1. Acute recurrent maxillary sinusitis J01.01 amoxicillin-clavulanate (AUGMENTIN) 875-125 MG per tablet.  Patient has maxillary sinus infection he uses Flonase and is on Zyrtec-D which is elevating his blood pressure today but he needs to get over this infection.  He can try a Kidder pot.  If this does not help his infection we will get him into see ear nose and throat.  This is a recurrent problem for him.         KATHY Lantigua MD  October 9, 2018

## 2018-10-09 ENCOUNTER — OFFICE VISIT (OUTPATIENT)
Dept: FAMILY MEDICINE | Facility: OTHER | Age: 53
End: 2018-10-09
Attending: FAMILY MEDICINE
Payer: COMMERCIAL

## 2018-10-09 VITALS
OXYGEN SATURATION: 94 % | SYSTOLIC BLOOD PRESSURE: 144 MMHG | WEIGHT: 251 LBS | TEMPERATURE: 97.2 F | HEART RATE: 66 BPM | BODY MASS INDEX: 32.23 KG/M2 | DIASTOLIC BLOOD PRESSURE: 102 MMHG

## 2018-10-09 DIAGNOSIS — J01.01 ACUTE RECURRENT MAXILLARY SINUSITIS: Primary | ICD-10-CM

## 2018-10-09 PROCEDURE — 99213 OFFICE O/P EST LOW 20 MIN: CPT | Performed by: FAMILY MEDICINE

## 2018-10-09 ASSESSMENT — PAIN SCALES - GENERAL: PAINLEVEL: NO PAIN (0)

## 2018-10-09 NOTE — NURSING NOTE
"Chief Complaint   Patient presents with     URI       Initial BP (!) 144/102  Pulse 66  Temp 97.2  F (36.2  C) (Tympanic)  Wt 251 lb (113.9 kg)  SpO2 94%  BMI 32.23 kg/m2 Estimated body mass index is 32.23 kg/(m^2) as calculated from the following:    Height as of 1/8/18: 6' 2\" (1.88 m).    Weight as of this encounter: 251 lb (113.9 kg).  Medication Reconciliation: complete    Maris Benavides MA    "

## 2018-10-09 NOTE — MR AVS SNAPSHOT
After Visit Summary   10/9/2018    Stu Lanza    MRN: [id number]           Patient Information     Date Of Birth          1965        Visit Information        Provider Department      10/9/2018 1:15 PM PEDRO LUIS Lantigua MD [facility]        Today's Diagnoses     Acute recurrent maxillary sinusitis    -  1       Follow-ups after your visit        Who to contact     If you have questions or need follow up information about today's clinic visit or your schedule please contact [facility] directly at [phone].  Normal or non-critical lab and imaging results will be communicated to you by MyChart, letter or phone within 4 business days after the clinic has received the results. If you do not hear from us within 7 days, please contact the clinic through MyChart or phone. If you have a critical or abnormal lab result, we will notify you by phone as soon as possible.  Submit refill requests through [employer] or call your pharmacy and they will forward the refill request to us. Please allow 3 business days for your refill to be completed.          Additional Information About Your Visit        Care EveryWhere ID     This is your Care EveryWhere ID. This could be used by other organizations to access your [place] medical records  [id number]        Your Vitals Were     Pulse Temperature Pulse Oximetry BMI (Body Mass Index)          66 97.2  F (36.2  C) (Tympanic) 94% 32.23 kg/m2         Blood Pressure from Last 3 Encounters:   10/09/18 (!) 144/102   01/08/18 134/72   08/10/17 136/74    Weight from Last 3 Encounters:   10/09/18 251 lb (113.9 kg)   01/08/18 230 lb (104.3 kg)   08/10/17 230 lb (104.3 kg)              Today, you had the following     No orders found for display         Today's Medication Changes          These changes are accurate as of 10/9/18  1:16 PM.  If you have any questions, ask your nurse or doctor.               Start taking these  medicines.        Dose/Directions    amoxicillin-clavulanate 875-125 MG per tablet   Commonly known as:  AUGMENTIN   Used for:  Acute recurrent maxillary sinusitis   Started by:  PEDRO LUIS Lantigua MD        Dose:  1 tablet   Take 1 tablet by mouth 2 times daily   Quantity:  20 tablet   Refills:  0            Where to get your medicines      These medications [facility] [place] [place] - [address] AT [facility] OF  [address], [facility] [address]     Phone:  [phone]     amoxicillin-clavulanate 875-125 MG per tablet                Primary Care Provider Office Phone # Fax #    PEDRO LUIS Lantigua [phone]       [address] [facility] [address]        Equal Access to Services     [facility] LU[facility] : Hadii yong christie hadasho Soomaali, waaxda luqadaha, qaybta kaalmada adeegyada, maria luisa carlton . [facility] [phone].    ATENCIÓN: Si habla español, tiene a melendrez disposición servicios gratuitos de asistencia lingüística. [facility] [phone].    We comply with applicable federal civil rights laws and [place] laws. We do not discriminate on the basis of race, color, national origin, age, disability, sex, sexual orientation, or gender identity.            Thank you!     Thank you for choosing [facility]  for your care. Our goal is always to provide you with excellent care. Hearing back from our patients is one way we can continue to improve our services. Please take a few minutes to complete the written survey that you may receive in the mail after your visit with us. Thank you!             Your Updated Medication List - Protect others around you: Learn how to safely use, store and throw away your medicines at www.disposemymeds.org.          This list is accurate as of 10/9/18  1:16 PM.  Always use your most recent med list.                   Brand Name Dispense Instructions for use Diagnosis    ACIDOPHILUS PROBIOTIC  BLEND Caps     100 capsule    Take 1 capsule by mouth 2 times daily    Anal pruritus       albuterol 108 (90 Base) MCG/ACT inhaler    PROAIR HFA/PROVENTIL HFA/VENTOLIN HFA    1 Inhaler    Inhale 2 puffs into the lungs every 6 hours as needed for shortness of breath / dyspnea or wheezing    Cough, Acute bronchospasm, Acute bronchitis, unspecified organism       amoxicillin-clavulanate 875-125 MG per tablet    AUGMENTIN    20 tablet    Take 1 tablet by mouth 2 times daily    Acute recurrent maxillary sinusitis       ZYRTEC-D PO

## 2019-01-29 NOTE — PROGRESS NOTES
SUBJECTIVE:   Stu Lanza is a 53 year old male who presents to clinic today for the following health issues:      RESPIRATORY SYMPTOMS/Sinus      Duration: on going for a few months    Description  nasal congestion    Severity: mild    Accompanying signs and symptoms: None    History (predisposing factors):  Seen in October with  - got better but has come back    Precipitating or alleviating factors: None    Therapies tried and outcome:  zyrtec        Problem list and histories reviewed & adjusted, as indicated.  Additional history: as documented    Patient Active Problem List   Diagnosis     GERD (gastroesophageal reflux disease)     Seborrheic keratosis     External hemorrhoids     Liver dysfunction     Seasonal allergic rhinitis due to pollen     Past Surgical History:   Procedure Laterality Date     COLONOSCOPY N/A 4/27/2016    Procedure: COLONOSCOPY;  Surgeon: Thomas Elizalde DO;  Location: [facility] OR     ESOPHAGOGASTRODUODENOSCOPY  12/2015     PULMONARY FUNCTION TEST  02/06/2018    normal       Social History     Tobacco Use     Smoking status: Never Smoker     Smokeless tobacco: Never Used   Substance Use Topics     Alcohol use: Yes     Family History   Problem Relation Age of Onset     Diabetes Father          Current Outpatient Medications   Medication Sig Dispense Refill     albuterol (PROAIR HFA/PROVENTIL HFA/VENTOLIN HFA) 108 (90 BASE) MCG/ACT Inhaler Inhale 2 puffs into the lungs every 6 hours as needed for shortness of breath / dyspnea or wheezing 1 Inhaler 0     Cetirizine-Pseudoephedrine (ZYRTEC-D PO)        Probiotic Product (ACIDOPHILUS PROBIOTIC BLEND) CAPS Take 1 capsule by mouth 2 times daily 100 capsule 3     No Known Allergies    Reviewed and updated as needed this visit by clinical staff       Reviewed and updated as needed this visit by Provider         ROS:  CONSTITUTIONAL:NEGATIVE for fever, chills, change in weight  INTEGUMENTARY/SKIN: NEGATIVE for worrisome rashes,  "moles or lesions  ENT/MOUTH: ear pain left and sinus pressure  RESP:cough and SOB due to postnasal drip   CV: NEGATIVE for chest pain, palpitations or peripheral edema    OBJECTIVE:     BP (!) 132/98   Pulse 72   Temp 97.6  F (36.4  C) (Tympanic)   Resp 16   Ht 1.88 m (6' 2\")   Wt 114.8 kg (253 lb)   SpO2 93%   BMI 32.48 kg/m    Body mass index is 32.48 kg/m .   GENERAL: alert and no distress  HENT: normal cephalic/atraumatic, right ear: normal: no effusions, no erythema, normal landmarks, left ear: cerumen , nose and mouth without ulcers or lesions, oropharynx erythema and cobblestone appearing, oral mucous membranes moist and sinuses: maxillary, frontal tenderness on both sides  NECK: no adenopathy, no asymmetry, masses, or scars and thyroid normal to palpation  RESP: wheezing throughout  CV: regular rate and rhythm, normal S1 S2, no S3 or S4, no murmur, click or rub, no peripheral edema and peripheral pulses strong  ABDOMEN: soft, nontender, no hepatosplenomegaly, no masses and bowel sounds normal  PSYCH: mentation appears normal, affect normal/bright    Diagnostic Test Results:  none     ASSESSMENT/PLAN:     1. Acute sinusitis with symptoms > 10 days  Take medication as directed. Discussed saline rinsing. Stu states he is so congested Flonase is not getting into his sinuses. Oral steroids prescribed. May need referral to ENT if he continues to have problems with his sinuses.   - amoxicillin-clavulanate (AUGMENTIN) 875-125 MG tablet; Take 1 tablet by mouth 2 times daily  Dispense: 20 tablet; Refill: 0  - predniSONE (DELTASONE) 20 MG tablet; Take 20 mg by mouth 2 times daily for 5 days.  Dispense: 10 tablet; Refill: 0        Follow up if no improvement or worsening symptoms.   See Patient Instructions    JAILYN Kaba [facility]    "

## 2019-01-30 ENCOUNTER — OFFICE VISIT (OUTPATIENT)
Dept: FAMILY MEDICINE | Facility: OTHER | Age: 54
End: 2019-01-30
Attending: NURSE PRACTITIONER
Payer: COMMERCIAL

## 2019-01-30 VITALS
TEMPERATURE: 97.6 F | HEIGHT: 74 IN | SYSTOLIC BLOOD PRESSURE: 132 MMHG | HEART RATE: 72 BPM | WEIGHT: 253 LBS | BODY MASS INDEX: 32.47 KG/M2 | OXYGEN SATURATION: 93 % | DIASTOLIC BLOOD PRESSURE: 98 MMHG | RESPIRATION RATE: 16 BRPM

## 2019-01-30 DIAGNOSIS — J01.90 ACUTE SINUSITIS WITH SYMPTOMS > 10 DAYS: Primary | ICD-10-CM

## 2019-01-30 PROCEDURE — 99213 OFFICE O/P EST LOW 20 MIN: CPT | Performed by: NURSE PRACTITIONER

## 2019-01-30 RX ORDER — PREDNISONE 20 MG/1
20 TABLET ORAL 2 TIMES DAILY
Qty: 10 TABLET | Refills: 0 | Status: SHIPPED | OUTPATIENT
Start: 2019-01-30 | End: 2019-04-16

## 2019-01-30 ASSESSMENT — MIFFLIN-ST. JEOR: SCORE: 2052.35

## 2019-01-30 ASSESSMENT — PAIN SCALES - GENERAL: PAINLEVEL: NO PAIN (0)

## 2019-01-30 NOTE — PATIENT INSTRUCTIONS
Patient Education     Self-Care for Sinusitis     Drinking plenty of water can help sinuses drain.   Sinusitis can often be managed with self-care. Self-care can keep sinuses moist and make you feel more comfortable. Remember to follow your doctor's instructions closely. This can make a big difference in getting your sinus problem under control.  Drink fluids  Drinking extra fluids helps thin your mucus. This lets it drain from your sinuses more easily. Have a glass of water every hour or two. A humidifier helps in much the same way. Fluids can also offset the drying effects of certain medicines. If you use a humidifier, follow the product maker's instructions on how to use it. Clean it on a regular schedule.  Use saltwater rinses  Rinses help keep your sinuses and nose moist. Mix a teaspoon of salt in 8 ounces of fresh, warm water. Use a bulb syringe to gently squirt the water into your nose a few times a day. You can also buy ready-made saline nasal sprays.  Apply hot or cold packs  Applying heat to the area surrounding your sinuses may make you feel more comfortable. Use a hot water bottle or a hand towel dipped in hot water. Some people also find ice packs effective for relieving pain.  Medicines  Your doctor may prescribe medications to help treat your sinusitis. If you have an infection, antibiotics can help clear it up. If you are prescribed antibiotics, take all pills on schedule until they are gone, even if you feel better. Decongestants help relieve swelling. Use decongestant sprays for short periods only under the direction of your doctor. If you have allergies, your doctor may prescribe medications to help relieve them.   Date Last Reviewed: 10/1/2016    [id number] [employer]. [address] [place] [id number]. All rights reserved. This information is not intended as a substitute for professional medical care. Always follow your healthcare professional's instructions.

## 2019-01-30 NOTE — NURSING NOTE
"Chief Complaint   Patient presents with     Sinus Problem       Initial BP (!) 132/98   Pulse 72   Temp 97.6  F (36.4  C) (Tympanic)   Resp 16   Ht 1.88 m (6' 2\")   Wt 114.8 kg (253 lb)   SpO2 93%   BMI 32.48 kg/m   Estimated body mass index is 32.48 kg/m  as calculated from the following:    Height as of this encounter: 1.88 m (6' 2\").    Weight as of this encounter: 114.8 kg (253 lb).  Medication Reconciliation: complete    Nena Brink LPN  "

## 2019-04-16 ENCOUNTER — ANCILLARY PROCEDURE (OUTPATIENT)
Dept: GENERAL RADIOLOGY | Facility: OTHER | Age: 54
End: 2019-04-16
Attending: FAMILY MEDICINE
Payer: COMMERCIAL

## 2019-04-16 ENCOUNTER — OFFICE VISIT (OUTPATIENT)
Dept: FAMILY MEDICINE | Facility: OTHER | Age: 54
End: 2019-04-16
Attending: FAMILY MEDICINE
Payer: COMMERCIAL

## 2019-04-16 VITALS
HEART RATE: 62 BPM | WEIGHT: 245 LBS | BODY MASS INDEX: 31.44 KG/M2 | HEIGHT: 74 IN | TEMPERATURE: 97.9 F | SYSTOLIC BLOOD PRESSURE: 130 MMHG | OXYGEN SATURATION: 93 % | DIASTOLIC BLOOD PRESSURE: 88 MMHG

## 2019-04-16 DIAGNOSIS — J30.2 SEASONAL ALLERGIC RHINITIS, UNSPECIFIED TRIGGER: ICD-10-CM

## 2019-04-16 DIAGNOSIS — J98.9 REACTIVE AIRWAY DISEASE THAT IS NOT ASTHMA: Primary | ICD-10-CM

## 2019-04-16 PROCEDURE — 71046 X-RAY EXAM CHEST 2 VIEWS: CPT | Mod: TC

## 2019-04-16 PROCEDURE — 99214 OFFICE O/P EST MOD 30 MIN: CPT | Performed by: FAMILY MEDICINE

## 2019-04-16 RX ORDER — ALBUTEROL SULFATE 90 UG/1
2 AEROSOL, METERED RESPIRATORY (INHALATION) EVERY 6 HOURS PRN
Qty: 18 G | Refills: 3 | Status: SHIPPED | OUTPATIENT
Start: 2019-04-16

## 2019-04-16 RX ORDER — PREDNISONE 20 MG/1
TABLET ORAL
Qty: 12 TABLET | Refills: 0 | Status: SHIPPED | OUTPATIENT
Start: 2019-04-16

## 2019-04-16 RX ORDER — MONTELUKAST SODIUM 10 MG/1
10 TABLET ORAL AT BEDTIME
Qty: 90 TABLET | Refills: 3 | Status: SHIPPED | OUTPATIENT
Start: 2019-04-16

## 2019-04-16 ASSESSMENT — PAIN SCALES - GENERAL: PAINLEVEL: NO PAIN (0)

## 2019-04-16 ASSESSMENT — MIFFLIN-ST. JEOR: SCORE: 2016.06

## 2019-04-16 NOTE — NURSING NOTE
"Chief Complaint   Patient presents with     URI       Initial /88   Pulse 62   Temp 97.9  F (36.6  C)   Ht 1.88 m (6' 2\")   Wt 111.1 kg (245 lb)   SpO2 93%   BMI 31.46 kg/m   Estimated body mass index is 31.46 kg/m  as calculated from the following:    Height as of this encounter: 1.88 m (6' 2\").    Weight as of this encounter: 111.1 kg (245 lb).  Medication Reconciliation: complete    Barrera Hart LPN  "

## 2019-04-16 NOTE — PROGRESS NOTES
"  SUBJECTIVE:   Stu Lanza is a 53 year old male who presents to clinic today for the following   health issues:      RESPIRATORY SYMPTOMS      Duration: September, January and again now    Description  rhinorrhea, cough, wheezing, fatigue/malaise and hoarse voice    Severity: moderate    Accompanying signs and symptoms: post-nasal drainage and has a hard time catching his breathe    History (predisposing factors):  none    Precipitating or alleviating factors: None    Therapies tried and outcome:  Antihistamine, inhaler has been using more often multiple times per day    Does get better and resolves- then gets worse    Increase PND and increase wheezing     MDI helps    Has RAD- has PFT     Moving to [place]- end of month of may       H/o allergies as a kid - house dust/cats/horses   [place]ived [place] cabin since July  3 chilel - same sx   Zytec D helps nad nasal steroid sprays do not.    To note last several CBC- high eosinophil count     Additional history:     Reviewed  and updated as needed this visit by clinical staff         Reviewed and updated as needed this visit by Provider         Labs reviewed in EPIC    ROS:  CONSTITUTIONAL: NEGATIVE for fever, chills, change in weight  CV: NEGATIVE for chest pain, palpitations or peripheral edema    OBJECTIVE:                                                    /88   Pulse 62   Temp 97.9  F (36.6  C)   Ht 1.88 m (6' 2\")   Wt 111.1 kg (245 lb)   SpO2 93%   BMI 31.46 kg/m    Body mass index is 31.46 kg/m .   GENERAL: healthy, alert, well nourished, well hydrated, no distress  HENT: ear canals- normal; TMs- normal; Nose- normal; Mouth- no ulcers, no lesions mild PND sinus nontender   NECK: no tenderness, no adenopathy, no asymmetry, no masses, no stiffness; thyroid- normal to palpation  RESP: lungs clear to auscultation - no rales, no rhonchi, no wheezes  CV: regular rates and rhythm, normal S1 S2, no S3 or S4 and no murmur, no click or rub -     "     ASSESSMENT/PLAN:                                                        ICD-10-CM    1. Reactive airway disease that is not asthma R09.89 XR Chest 2 Views     montelukast (SINGULAIR) 10 MG tablet     albuterol (PROAIR HFA/PROVENTIL HFA/VENTOLIN HFA) 108 (90 Base) MCG/ACT inhaler     predniSONE (DELTASONE) 20 MG tablet   2. Seasonal allergic rhinitis, unspecified trigger J30.2 XR Chest 2 Views     montelukast (SINGULAIR) 10 MG tablet     albuterol (PROAIR HFA/PROVENTIL HFA/VENTOLIN HFA) 108 (90 Base) MCG/ACT inhaler     predniSONE (DELTASONE) 20 MG tablet     Pt is to  Zyrtec D and continue. Add prednisone to help now and then maintain on zyrtec and singulair.  Albuterol prn. Stay on meds for 2-3 months and see what happens in new environment of [place].  25 minutes was spent with patient and over 50%  of this time was spent on counseling patient regarding  illness, medication and / or treatment  options, coordinating further cares and follow ups that are needed along with resource material that will be helpful in the treatment of these issues.   Pt has had steroids in past and did well.       Carl Wasserman MD  [facility]

## 2019-08-06 DIAGNOSIS — J98.9 REACTIVE AIRWAY DISEASE THAT IS NOT ASTHMA: ICD-10-CM

## 2019-08-06 DIAGNOSIS — J30.2 SEASONAL ALLERGIC RHINITIS, UNSPECIFIED TRIGGER: ICD-10-CM

## 2019-08-06 RX ORDER — MONTELUKAST SODIUM 10 MG/1
10 TABLET ORAL AT BEDTIME
Qty: 90 TABLET | Refills: 3 | OUTPATIENT
Start: 2019-08-06

## 2019-08-06 NOTE — TELEPHONE ENCOUNTER
Singulair  Last visit date with prescribing provider: 4-  Last refill date: 4-  Quantity: 90, Refills: 3    Pt moved to [place]  Was told by  , he will refill medication 1 time after move.    Lani Patterson

## 2019-08-16 RX ORDER — MONTELUKAST SODIUM 10 MG/1
10 TABLET ORAL AT BEDTIME
Qty: 90 TABLET | Refills: 3 | OUTPATIENT
Start: 2019-08-16

## 2019-08-16 NOTE — TELEPHONE ENCOUNTER
Patient called today to check the status of his refill and it looks like the encounter was closed. Please call patient if this medication will be approved or not.